# Patient Record
Sex: FEMALE | Race: BLACK OR AFRICAN AMERICAN | Employment: UNEMPLOYED | URBAN - METROPOLITAN AREA
[De-identification: names, ages, dates, MRNs, and addresses within clinical notes are randomized per-mention and may not be internally consistent; named-entity substitution may affect disease eponyms.]

---

## 2022-08-24 ENCOUNTER — TRANSCRIBE ORDER (OUTPATIENT)
Dept: SCHEDULING | Age: 52
End: 2022-08-24

## 2022-08-24 ENCOUNTER — OFFICE VISIT (OUTPATIENT)
Dept: CARDIOLOGY CLINIC | Age: 52
End: 2022-08-24
Payer: COMMERCIAL

## 2022-08-24 VITALS
HEIGHT: 67 IN | BODY MASS INDEX: 29.51 KG/M2 | WEIGHT: 188 LBS | OXYGEN SATURATION: 97 % | SYSTOLIC BLOOD PRESSURE: 120 MMHG | DIASTOLIC BLOOD PRESSURE: 80 MMHG | HEART RATE: 79 BPM

## 2022-08-24 DIAGNOSIS — R07.9 CHEST PAIN, UNSPECIFIED TYPE: Primary | ICD-10-CM

## 2022-08-24 DIAGNOSIS — Z13.6 SCREENING FOR ISCHEMIC HEART DISEASE: Primary | ICD-10-CM

## 2022-08-24 DIAGNOSIS — E78.00 HIGH CHOLESTEROL: ICD-10-CM

## 2022-08-24 PROBLEM — G47.30 SLEEP APNEA: Status: ACTIVE | Noted: 2022-08-24

## 2022-08-24 PROBLEM — R73.03 PREDIABETES: Status: ACTIVE | Noted: 2022-08-24

## 2022-08-24 PROCEDURE — 99203 OFFICE O/P NEW LOW 30 MIN: CPT | Performed by: SPECIALIST

## 2022-08-24 PROCEDURE — 93000 ELECTROCARDIOGRAM COMPLETE: CPT | Performed by: SPECIALIST

## 2022-08-24 RX ORDER — PRAVASTATIN SODIUM 10 MG/1
TABLET ORAL
COMMUNITY
Start: 2022-06-09

## 2022-08-24 RX ORDER — LANOLIN ALCOHOL/MO/W.PET/CERES
1000 CREAM (GRAM) TOPICAL DAILY
COMMUNITY

## 2022-08-24 NOTE — PROGRESS NOTES
Danisha Banks MD. [facility]          Patient: Idris Ledezma  : 1970      Today's Date: 2022      HISTORY OF PRESENT ILLNESS:     History of Present Illness:    Has been having intermittent L-sided CP for years. CP seems to come on randomly - could be sitting still/resting or when up moving around. Chad tells her she breaths really heavily and snores. Diagnosed with MELANIA and has oral appliance. Has been using it for ~ 2.5 months. Has had a stress test and echo in 2016 or 2018 (at [facility]) that were normal.      Woke up ~6 weeks ago with L-sided CP that radiated to left shoulder. Entire left arm was tingling/numb at that time. Elsie Karissa. Symptoms lasted ~1 hour before resolving. No SOB, nausea, vomiting, diaphoresis. Has noticed a few episodes of palpitations when resting (Ex: watching TV) that resolve on their own. Has had occasional brief episodes that last minutes to hours and resolve on their own. Exercises 3-4 times a week. Has 2 children. 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 Works for the [employer]. Likes to travel. PAST MEDICAL HISTORY:     Past Medical History:   Diagnosis Date    High cholesterol     History of cigarette smoking     quit 2016    Prediabetes     Sleep apnea        Past Surgical History:   Procedure Laterality Date    HX HYSTERECTOMY  2014    HX LIPOSUCTION             CURRENT MEDICATIONS:      Current Outpatient Medications   Medication Sig Dispense Refill    pravastatin (PRAVACHOL) 10 mg tablet       cyanocobalamin 1,000 mcg tablet Take 1,000 mcg by mouth daily. No Known Allergies      SOCIAL HISTORY:     Social History     Tobacco Use    Smoking status: Former     Types: Cigarettes     Quit date: 2016     Years since quittin.6    Smokeless tobacco: Never    Tobacco comments:     From age 23 until 2016. Smoked a few cigarettes/day.     Substance Use Topics    Alcohol use: Yes     Alcohol/week: 3.0 standard drinks     Types: 3 Glasses of wine per week     Comment: 3-5 glasses of wine/week. Drug use: Never         FAMILY HISTORY:     Family History   Problem Relation Age of Onset    Cancer Mother     Breast Cancer Mother          REVIEW OF SYMPTOMS:     Review of Symptoms:  Constitutional: Negative for fever, chills  HEENT: Negative for nosebleeds, tinnitus, and vision changes. Respiratory: Negative for cough, wheezing  Cardiovascular: Negative for orthopnea, claudication, syncope, and PND. Gastrointestinal: Negative for abdominal pain, diarrhea, melena. Genitourinary: Negative for dysuria  Musculoskeletal: Negative for myalgias. Skin: Negative for rash  Heme: No problems bleeding. Neurological: Negative for speech change and focal weakness. PHYSICAL EXAM:     Physical Exam:  Visit Vitals  /80 (BP 1 Location: Left upper arm, BP Patient Position: Sitting)   Pulse 79   Ht 5' 7\" (1.702 m)   Wt 188 lb (85.3 kg)   SpO2 97%   BMI 29.44 kg/m²       Patient appears generally well, mood and affect are appropriate and pleasant. HEENT:  Hearing intact, non-icteric, normocephalic, atraumatic. Neck Exam: Supple, No JVD or carotid bruits. Lung Exam: Clear to auscultation, even breath sounds. Cardiac Exam: Regular rate and rhythm with no murmur or rub  Abdomen: Soft, non-tender, normal bowel sounds. Extremities: Moves all ext well. No lower extremity edema. MSKTL: Overall good ROM ext  Skin: No significant rashes  Psych: Appropriate affect  Neuro - Grossly intact        LABS / OTHER STUDIES:     No results found for: NA, K, CL, CO2, AGAP, GLU, BUN, CREA, BUCR, GFRAA, GFRNA, CA, TBIL, TBILI, AP, TP, ALB, GLOB, AGRAT, ALT, AST    Have requested labs to review       CARDIAC DIAGNOSTICS:     Cardiac Evaluation Includes:  I reviewed the test results below.     Stress test and echo normal in 2016-2108 at [facility] per patient         ASSESSMENT AND PLAN:     Assessment and Plan:  1) Atypical CP   - she has a long history of atypical CP   - Will check a a stress echo   - Also check a CT heart scan for longer term risk stratification     2) Dyslipidemia   - cont statin    3) Phone FU. See me PRN. Works for [employer]. Has 2 daughters. Recently engaged. Likes to travel. Danisha Banks MD, [place] [id number] [facility], [address]  [place] [id number] [address], [address]  Ph: [phone]   Ph [phone]      ADDENDUM   10/3/2022    CT Heart scan 10/3/22 - CAC score 0     Stress Echo 10/3/22 - walked 9 min (10 METS), no CP or ischemic EKG changes.   Normal stress echo       Will send a message

## 2022-08-24 NOTE — PROGRESS NOTES
Chief Complaint   Patient presents with    New Patient     CP        Visit Vitals  /80 (BP 1 Location: Left upper arm, BP Patient Position: Sitting)   Pulse 79   Ht 5' 7\" (1.702 m)   Wt 188 lb (85.3 kg)   SpO2 97%   BMI 29.44 kg/m²     Chest pain - Yes, comes and goes. SOB -  sleep apnea she feels she breath heavy. Palpitations - No   Swelling in hands/feet - Ankles   Dizziness - No   Recent hospital stays denied   Refills denied   Having headache right now.

## 2022-10-03 ENCOUNTER — APPOINTMENT (OUTPATIENT)
Dept: CARDIOLOGY CLINIC | Age: 52
End: 2022-10-03

## 2022-10-03 ENCOUNTER — HOSPITAL ENCOUNTER (OUTPATIENT)
Dept: CT IMAGING | Age: 52
Discharge: HOME | End: 2022-10-03
Attending: SPECIALIST
Payer: SELF-PAY

## 2022-10-03 ENCOUNTER — ANCILLARY PROCEDURE (OUTPATIENT)
Dept: CARDIOLOGY CLINIC | Age: 52
End: 2022-10-03

## 2022-10-03 VITALS — BODY MASS INDEX: 29.51 KG/M2 | HEIGHT: 67 IN | WEIGHT: 188 LBS

## 2022-10-03 DIAGNOSIS — R07.9 CHEST PAIN, UNSPECIFIED TYPE: ICD-10-CM

## 2022-10-03 DIAGNOSIS — Z13.6 SCREENING FOR ISCHEMIC HEART DISEASE: ICD-10-CM

## 2022-10-03 LAB
STRESS ANGINA INDEX: 0
STRESS BASELINE DIAS BP: 76 MMHG
STRESS BASELINE HR: 73 BPM
STRESS BASELINE SYS BP: 124 MMHG
STRESS ESTIMATED WORKLOAD: 10.1 METS
STRESS EXERCISE DUR MIN: 9 MIN
STRESS EXERCISE DUR SEC: 0 SEC
STRESS O2 SAT PEAK: 99 %
STRESS O2 SAT REST: 97 %
STRESS PEAK DIAS BP: 86 MMHG
STRESS PEAK SYS BP: 150 MMHG
STRESS PERCENT HR ACHIEVED: 93 %
STRESS POST PEAK HR: 155 BPM
STRESS RATE PRESSURE PRODUCT: NORMAL BPM*MMHG
STRESS SR DUKE TREADMILL SCORE: 9
STRESS ST DEPRESSION: 0 MM
STRESS TARGET HR: 166 BPM

## 2022-10-03 PROCEDURE — 75571 CT HRT W/O DYE W/CA TEST: CPT

## 2022-10-03 PROCEDURE — 93351 STRESS TTE COMPLETE: CPT | Performed by: SPECIALIST

## 2022-10-04 NOTE — PROGRESS NOTES
Can you please let her know that her stress echo and CT heart scan were normal (CAC score 0). No evidence of CAD. And heart function is normal.    Control risk factors and see me PRN.    Thanks

## 2022-10-07 ENCOUNTER — TELEPHONE (OUTPATIENT)
Dept: CARDIOLOGY CLINIC | Age: 52
End: 2022-10-07

## 2022-10-07 NOTE — TELEPHONE ENCOUNTER
LVM  Per Dr. Eros Rutherford: Ciara Ellis you please let her know that her stress echo and CT heart scan were normal (CAC score 0). No evidence of CAD. And heart function is normal.     Control risk factors and see me PRN. \"